# Patient Record
Sex: FEMALE | Race: WHITE | Employment: FULL TIME | URBAN - METROPOLITAN AREA
[De-identification: names, ages, dates, MRNs, and addresses within clinical notes are randomized per-mention and may not be internally consistent; named-entity substitution may affect disease eponyms.]

---

## 2022-11-25 ENCOUNTER — HOSPITAL ENCOUNTER (INPATIENT)
Age: 32
LOS: 2 days | Discharge: HOME | DRG: 872 | End: 2022-11-27
Attending: INTERNAL MEDICINE | Admitting: INTERNAL MEDICINE

## 2022-11-25 PROBLEM — N39.0 URINARY TRACT INFECTION: Status: ACTIVE | Noted: 2022-11-25

## 2022-11-25 PROBLEM — A41.9 SEPSIS (HCC): Status: ACTIVE | Noted: 2022-11-25

## 2022-11-25 PROBLEM — R10.30 ABDOMINAL PAIN, LOWER: Status: ACTIVE | Noted: 2022-11-25

## 2022-11-25 PROBLEM — E66.9 OBESITY: Status: ACTIVE | Noted: 2022-11-25

## 2022-11-25 PROBLEM — N83.209 OVARIAN CYSTIC MASS: Status: ACTIVE | Noted: 2022-11-25

## 2022-11-25 PROCEDURE — 1200000000 HC SEMI PRIVATE

## 2022-11-25 PROCEDURE — 6360000002 HC RX W HCPCS: Performed by: INTERNAL MEDICINE

## 2022-11-25 RX ORDER — ONDANSETRON 2 MG/ML
4 INJECTION INTRAMUSCULAR; INTRAVENOUS EVERY 6 HOURS PRN
Status: DISCONTINUED | OUTPATIENT
Start: 2022-11-25 | End: 2022-11-27 | Stop reason: HOSPADM

## 2022-11-25 RX ORDER — SODIUM CHLORIDE 0.9 % (FLUSH) 0.9 %
5-40 SYRINGE (ML) INJECTION PRN
Status: DISCONTINUED | OUTPATIENT
Start: 2022-11-25 | End: 2022-11-27 | Stop reason: HOSPADM

## 2022-11-25 RX ORDER — ACETAMINOPHEN 650 MG/1
650 SUPPOSITORY RECTAL EVERY 6 HOURS PRN
Status: DISCONTINUED | OUTPATIENT
Start: 2022-11-25 | End: 2022-11-27 | Stop reason: HOSPADM

## 2022-11-25 RX ORDER — ENOXAPARIN SODIUM 100 MG/ML
30 INJECTION SUBCUTANEOUS 2 TIMES DAILY
Status: DISCONTINUED | OUTPATIENT
Start: 2022-11-25 | End: 2022-11-27 | Stop reason: HOSPADM

## 2022-11-25 RX ORDER — SODIUM CHLORIDE 0.9 % (FLUSH) 0.9 %
5-40 SYRINGE (ML) INJECTION EVERY 12 HOURS SCHEDULED
Status: DISCONTINUED | OUTPATIENT
Start: 2022-11-25 | End: 2022-11-27 | Stop reason: HOSPADM

## 2022-11-25 RX ORDER — POLYETHYLENE GLYCOL 3350 17 G/17G
17 POWDER, FOR SOLUTION ORAL DAILY PRN
Status: DISCONTINUED | OUTPATIENT
Start: 2022-11-25 | End: 2022-11-27 | Stop reason: HOSPADM

## 2022-11-25 RX ORDER — SODIUM CHLORIDE 9 MG/ML
INJECTION, SOLUTION INTRAVENOUS CONTINUOUS
Status: DISCONTINUED | OUTPATIENT
Start: 2022-11-26 | End: 2022-11-26

## 2022-11-25 RX ORDER — ONDANSETRON 4 MG/1
4 TABLET, ORALLY DISINTEGRATING ORAL EVERY 8 HOURS PRN
Status: DISCONTINUED | OUTPATIENT
Start: 2022-11-25 | End: 2022-11-27 | Stop reason: HOSPADM

## 2022-11-25 RX ORDER — ACETAMINOPHEN 325 MG/1
650 TABLET ORAL EVERY 6 HOURS PRN
Status: DISCONTINUED | OUTPATIENT
Start: 2022-11-25 | End: 2022-11-27 | Stop reason: HOSPADM

## 2022-11-25 RX ORDER — SODIUM CHLORIDE 9 MG/ML
INJECTION, SOLUTION INTRAVENOUS PRN
Status: DISCONTINUED | OUTPATIENT
Start: 2022-11-25 | End: 2022-11-27 | Stop reason: HOSPADM

## 2022-11-25 RX ORDER — NORETHINDRONE ACETATE AND ETHINYL ESTRADIOL 1MG-20(21)
1 KIT ORAL DAILY
Status: ON HOLD | COMMUNITY
End: 2022-11-27 | Stop reason: HOSPADM

## 2022-11-25 RX ADMIN — ENOXAPARIN SODIUM 30 MG: 100 INJECTION SUBCUTANEOUS at 23:52

## 2022-11-25 ASSESSMENT — PAIN DESCRIPTION - FREQUENCY: FREQUENCY: INTERMITTENT

## 2022-11-25 ASSESSMENT — PAIN DESCRIPTION - ORIENTATION: ORIENTATION: LOWER

## 2022-11-25 ASSESSMENT — PAIN DESCRIPTION - PAIN TYPE: TYPE: ACUTE PAIN

## 2022-11-25 ASSESSMENT — PAIN DESCRIPTION - DESCRIPTORS: DESCRIPTORS: ACHING

## 2022-11-25 ASSESSMENT — PAIN DESCRIPTION - LOCATION: LOCATION: ABDOMEN

## 2022-11-25 ASSESSMENT — PAIN SCALES - GENERAL: PAINLEVEL_OUTOF10: 2

## 2022-11-25 NOTE — PROGRESS NOTES
Transfer/acceptance note  11/25/2022 from [facility] ER    Diagnosis:  Sepsis due to UTI  Pelvic mass  Ascites      70-year-old lady with no significant past medical history who presented to peripheral ER for 3-day history of lower abdominal pain worse on the right side with associated abdominal pressure and distention, loss of appetite, diaphoresis with no apparent chills or fevers. Urinalysis was positive. CT abdomen pelvis noted for pelvic mass with ascites  Laboratory work-up with WBC of 14.2, hemoglobin 13.4, hematocrit 40, platelets 0071  Sodium 133, potassium 4.0, chloride 100, bicarbonate 22, BUN 7, creatinine 1.0, glucose 145  Alkaline phosphatase 80, ALT 19, AST 26, lipase 24    On presentation to the ER she was noted to have a heart rate of 1 30-1 40 with blood pressure 135/79. She received IV fluids with improvement of heart rate to 115.   Saturations are 97% room air     She was concerning for sepsis due to UTI complicated by pelvic mass with ascites

## 2022-11-26 ENCOUNTER — APPOINTMENT (OUTPATIENT)
Dept: ULTRASOUND IMAGING | Age: 32
DRG: 872 | End: 2022-11-26
Attending: INTERNAL MEDICINE

## 2022-11-26 LAB
A/G RATIO: 0.8 (ref 1.1–2.2)
ALBUMIN SERPL-MCNC: 3.2 G/DL (ref 3.4–5)
ALP BLD-CCNC: 69 U/L (ref 40–129)
ALT SERPL-CCNC: 9 U/L (ref 10–40)
ANION GAP SERPL CALCULATED.3IONS-SCNC: 10 MMOL/L (ref 3–16)
AST SERPL-CCNC: 11 U/L (ref 15–37)
BASOPHILS ABSOLUTE: 0 K/UL (ref 0–0.2)
BASOPHILS RELATIVE PERCENT: 0.3 %
BILIRUB SERPL-MCNC: 1.3 MG/DL (ref 0–1)
BUN BLDV-MCNC: 8 MG/DL (ref 7–20)
CA 125: 70.4 U/ML (ref 0–35)
CALCIUM SERPL-MCNC: 9.1 MG/DL (ref 8.3–10.6)
CHLORIDE BLD-SCNC: 101 MMOL/L (ref 99–110)
CO2: 23 MMOL/L (ref 21–32)
CREAT SERPL-MCNC: 0.7 MG/DL (ref 0.6–1.1)
EOSINOPHILS ABSOLUTE: 0 K/UL (ref 0–0.6)
EOSINOPHILS RELATIVE PERCENT: 0.1 %
GFR SERPL CREATININE-BSD FRML MDRD: >60 ML/MIN/{1.73_M2}
GLUCOSE BLD-MCNC: 129 MG/DL (ref 70–99)
HCT VFR BLD CALC: 34.6 % (ref 36–48)
HEMOGLOBIN: 11.3 G/DL (ref 12–16)
LACTIC ACID: 0.9 MMOL/L (ref 0.4–2)
LYMPHOCYTES ABSOLUTE: 0.8 K/UL (ref 1–5.1)
LYMPHOCYTES RELATIVE PERCENT: 6.1 %
MCH RBC QN AUTO: 25.8 PG (ref 26–34)
MCHC RBC AUTO-ENTMCNC: 32.6 G/DL (ref 31–36)
MCV RBC AUTO: 79.2 FL (ref 80–100)
MONOCYTES ABSOLUTE: 1.5 K/UL (ref 0–1.3)
MONOCYTES RELATIVE PERCENT: 11.2 %
NEUTROPHILS ABSOLUTE: 10.7 K/UL (ref 1.7–7.7)
NEUTROPHILS RELATIVE PERCENT: 82.3 %
PDW BLD-RTO: 15.3 % (ref 12.4–15.4)
PLATELET # BLD: 446 K/UL (ref 135–450)
PMV BLD AUTO: 7.3 FL (ref 5–10.5)
POTASSIUM REFLEX MAGNESIUM: 4.6 MMOL/L (ref 3.5–5.1)
RBC # BLD: 4.38 M/UL (ref 4–5.2)
SODIUM BLD-SCNC: 134 MMOL/L (ref 136–145)
TOTAL PROTEIN: 7.4 G/DL (ref 6.4–8.2)
WBC # BLD: 13 K/UL (ref 4–11)

## 2022-11-26 PROCEDURE — 36415 COLL VENOUS BLD VENIPUNCTURE: CPT

## 2022-11-26 PROCEDURE — 2580000003 HC RX 258: Performed by: INTERNAL MEDICINE

## 2022-11-26 PROCEDURE — 6370000000 HC RX 637 (ALT 250 FOR IP): Performed by: INTERNAL MEDICINE

## 2022-11-26 PROCEDURE — 85025 COMPLETE CBC W/AUTO DIFF WBC: CPT

## 2022-11-26 PROCEDURE — 6360000002 HC RX W HCPCS: Performed by: INTERNAL MEDICINE

## 2022-11-26 PROCEDURE — 76856 US EXAM PELVIC COMPLETE: CPT

## 2022-11-26 PROCEDURE — 1200000000 HC SEMI PRIVATE

## 2022-11-26 PROCEDURE — 83605 ASSAY OF LACTIC ACID: CPT

## 2022-11-26 PROCEDURE — 80053 COMPREHEN METABOLIC PANEL: CPT

## 2022-11-26 PROCEDURE — 86304 IMMUNOASSAY TUMOR CA 125: CPT

## 2022-11-26 PROCEDURE — 76700 US EXAM ABDOM COMPLETE: CPT

## 2022-11-26 RX ORDER — OXYCODONE HYDROCHLORIDE 5 MG/1
5 TABLET ORAL EVERY 4 HOURS PRN
Status: DISCONTINUED | OUTPATIENT
Start: 2022-11-26 | End: 2022-11-27 | Stop reason: HOSPADM

## 2022-11-26 RX ORDER — LORAZEPAM 0.5 MG/1
0.5 TABLET ORAL EVERY 4 HOURS PRN
Status: DISCONTINUED | OUTPATIENT
Start: 2022-11-26 | End: 2022-11-27 | Stop reason: HOSPADM

## 2022-11-26 RX ORDER — OXYCODONE HYDROCHLORIDE 5 MG/1
5 TABLET ORAL ONCE
Status: COMPLETED | OUTPATIENT
Start: 2022-11-26 | End: 2022-11-26

## 2022-11-26 RX ADMIN — ACETAMINOPHEN 650 MG: 325 TABLET ORAL at 21:21

## 2022-11-26 RX ADMIN — ACETAMINOPHEN 650 MG: 325 TABLET ORAL at 04:52

## 2022-11-26 RX ADMIN — OXYCODONE 5 MG: 5 TABLET ORAL at 16:51

## 2022-11-26 RX ADMIN — OXYCODONE 5 MG: 5 TABLET ORAL at 11:24

## 2022-11-26 RX ADMIN — OXYCODONE 5 MG: 5 TABLET ORAL at 04:52

## 2022-11-26 RX ADMIN — ENOXAPARIN SODIUM 30 MG: 100 INJECTION SUBCUTANEOUS at 21:23

## 2022-11-26 RX ADMIN — CEFTRIAXONE SODIUM 1000 MG: 1 INJECTION, POWDER, FOR SOLUTION INTRAMUSCULAR; INTRAVENOUS at 00:00

## 2022-11-26 RX ADMIN — Medication 10 ML: at 00:00

## 2022-11-26 RX ADMIN — ENOXAPARIN SODIUM 30 MG: 100 INJECTION SUBCUTANEOUS at 08:10

## 2022-11-26 RX ADMIN — SODIUM CHLORIDE: 9 INJECTION, SOLUTION INTRAVENOUS at 01:19

## 2022-11-26 ASSESSMENT — PAIN DESCRIPTION - LOCATION
LOCATION: ABDOMEN

## 2022-11-26 ASSESSMENT — PAIN SCALES - GENERAL
PAINLEVEL_OUTOF10: 5
PAINLEVEL_OUTOF10: 3
PAINLEVEL_OUTOF10: 4
PAINLEVEL_OUTOF10: 7
PAINLEVEL_OUTOF10: 2
PAINLEVEL_OUTOF10: 2
PAINLEVEL_OUTOF10: 7

## 2022-11-26 ASSESSMENT — PAIN DESCRIPTION - ORIENTATION
ORIENTATION: LOWER

## 2022-11-26 ASSESSMENT — PAIN DESCRIPTION - DESCRIPTORS
DESCRIPTORS: DISCOMFORT
DESCRIPTORS: ACHING

## 2022-11-26 ASSESSMENT — PAIN - FUNCTIONAL ASSESSMENT: PAIN_FUNCTIONAL_ASSESSMENT: ACTIVITIES ARE NOT PREVENTED

## 2022-11-26 NOTE — CONSULTS
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 She had been in her usual state of good health until three or four days ago when she had onset of dysuria. She treated for a UTI without change in her symptoms. She noted pressure and fullness and went to a local urgent care where imaging revealed the mass. 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 Since arrival she has continued overall to feel well other than pressure. She is passing gas and has low stool output in keeping with not eating well for the past 3-4 days. She has not had any problem meeting her work requirements, denying easy fatigue and early satiety. She has regular periods on oral contraceptives. Family history is non-contributory. 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 She will need a laparotomy and the details of the surgery have yet to be discussed. In any event as she is not in pain and is not obstructed. She is able to eat and drink normally. In short, this is not an emergency situation. The patient and her family will discuss the logistics. The parents live in [facility]. 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 She may elect to be discharged after the MRI and be readmitted at the hospital of her choice or she may opt to stay and have surgery here. We will re-discuss this tomorrow after the MRI results. Ann Juarez M.D., [address] [facility]  Consultative Gynecology  [phone]

## 2022-11-26 NOTE — PROGRESS NOTES
4 Eyes Skin Assessment     NAME:  Elliot Mo  YOB: 1990  MEDICAL RECORD NUMBER:  [id number]    The patient is being assessed for  Admission    I agree that One RN have performed a thorough Head to Toe Skin Assessment on the patient. ALL assessment sites listed below have been assessed. Areas assessed by both nurses:    Head, Face, Ears, Shoulders, Back, Chest, Arms, Elbows, Hands, Sacrum. Buttock, Coccyx, Ischium, and Legs. Feet and Heels        Does the Patient have a Wound?  No noted wound(s)       Lalo Prevention initiated by RN: No   Wound Care Orders initiated by RN: No    Pressure Injury (Stage 3,4, Unstageable, DTI, NWPT, and Complex wounds) if present place referral order by RN under : No    New and Established Ostomies, if present place, referral order under : No      Nurse 1 eSignature: Electronically signed by Carly Hayes RN on 11/25/22 at 10:40 PM EST    **SHARE this note so that the co-signing nurse is able to place an eSignature**    Nurse 2 eSignature: Electronically signed by Inessa Middleton RN on 11/25/22 at 10:55 PM EST

## 2022-11-26 NOTE — CONSULTS
Oncology Hematology Care   CONSULT NOTE    11/26/2022 2:18 PM    Patient Information: Wallace Ramirezena   Date of Admit:  11/25/2022  Primary Care Physician:  No primary care provider on file. Requesting Physician:  Sanjay Carr MD    Reason for consult:    Hematology/oncology was consulted for Pelvic mass    Chief complaint:    Hematology/oncology was consulted for Pelvic mass       History of Present Illness:    Estrada Lafleur is a 73-year-old female with no significant past medical history. 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 The patient states that she has been having ongoing abdominal pain and feeling unwell for 3 to 4 days prior to presenting to the outside ER. When she presented to the outside ER she was found to have a pelvic mass and ascites concerning for an ovarian malignancy. As well as complicated UTI. Since starting antibiotics and fluid resuscitation the patient states that she is feeling better. Past Medical History:     has no past medical history on file. Past Surgical History:    No past surgical history on file. Current Medications:     sodium chloride flush  5-40 mL IntraVENous 2 times per day    enoxaparin  30 mg SubCUTAneous BID    cefTRIAXone (ROCEPHIN) IV  1,000 mg IntraVENous Q24H           Allergies:    No Known Allergies     Social History:    reports that she has never smoked. She has never used smokeless tobacco.         Family History:     family history is not on file.          ROS:  14 point review of systems performed negative except for as documented in the HPI        PHYSICAL EXAM:        Vitals:    11/26/22 0800   BP: (!) 144/92   Pulse: (!) 105   Resp: 16   Temp: 98.2 °F (36.8 °C)   SpO2: 96%          CONSTITUTIONAL: awake, alert, cooperative, no apparent distress     EYES: No Conjunctivitis, No icterus    ENT: Normocephalic, atraumatic, No external ear deformities    NECK: No palpable adenopathy    HEMATOLOGIC/LYMPHATIC: no cervical, supraclavicular or axillary lymphadenopathy     LUNGS:  clear to auscultation no added sounds    CARDIOVASCULAR: regular rate and rhythm, normal S1 and S2, no murmur noted    ABDOMEN: Soft, Non tender, Non distended, No hepatosplenomegaly     MUSCULOSKELETAL: full range of motion noted, tone is normal    NEUROLOGIC: AAO X 3, No gross focal neurological deficits    EXTREMITIES: no LE edema             DATA:  CBC:   Lab Results   Component Value Date/Time    WBC 13.0 11/26/2022 06:10 AM    RBC 4.38 11/26/2022 06:10 AM    HGB 11.3 11/26/2022 06:10 AM    HCT 34.6 11/26/2022 06:10 AM    MCV 79.2 11/26/2022 06:10 AM    MCH 25.8 11/26/2022 06:10 AM    MCHC 32.6 11/26/2022 06:10 AM    RDW 15.3 11/26/2022 06:10 AM     11/26/2022 06:10 AM    MPV 7.3 11/26/2022 06:10 AM     BMP:  Lab Results   Component Value Date/Time     11/26/2022 06:10 AM    K 4.6 11/26/2022 06:10 AM     11/26/2022 06:10 AM    CO2 23 11/26/2022 06:10 AM    BUN 8 11/26/2022 06:10 AM    CREATININE 0.7 11/26/2022 06:10 AM    CALCIUM 9.1 11/26/2022 06:10 AM    LABGLOM >60 11/26/2022 06:10 AM    GLUCOSE 129 11/26/2022 06:10 AM     Magnesium: No results found for: MG  LIVER PROFILE:   Recent Labs     11/26/22  0610   AST 11*   ALT 9*   BILITOT 1.3*   ALKPHOS 69     PT/INR:  No results found for: PROTIME, INR        IMPRESSION/RECOMMENDATIONS:      71-year-old female who presents to outside ER for abdominal pain found to have a pelvic mass concerning for ovarian malignancy for which hematology/oncology was consulted    #Pelvic mass  -Outside CT scan report unavailable  -Pelvic ultrasound performed today results of which are pending  -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   -Given she lives close to [employer] she wishes to be transferred there for her care  -We will get MRI of the abdomen and pelvis with contrast for further characterization and visualization of the ovarian mass  -If ultrasound shows ascites will get paracentesis to be sent for cytology.       Katia Troncoso MD  Oncology Hematology Care

## 2022-11-26 NOTE — CARE COORDINATION
Discharge Planning:      CM/SW has reviewed this patient's medical history in detail and updated the computerized patient record. Patient independent prior to admission. There are no needs identified at this time. If something specific is identified, please notify Discharge Planner.       Financial consult placed, pt is self-pay    Electronically signed by Neri Camilo RN on 11/26/2022 at 12:36 PM Standard Counseling: I stressed the importance of regular monthly self-examinations. They should examine the buttocks, gluteal cleft, genitalia and bottom of the feet with a hand held mirror. Detail Level: Detailed Additional Instructions: Standard

## 2022-11-26 NOTE — PROGRESS NOTES
Hospitalist Progress Note      PCP: No primary care provider on file. Date of Admission: 11/25/2022    LOS: 1    Chief Complaint: No chief complaint on file. Case Summary:   71-year-old lady with no significant past medical history who presented to peripheral ER for 3-day history of lower abdominal pain worse on the right side with associated abdominal pressure and distention, loss of appetite, diaphoresis and CT abdomen noted for pelvic mass with ascites concerning for ovarian malignancy. She is complicated by urinary tract infection with sepsis      Active Hospital Problems    Diagnosis Date Noted    Urinary tract infection [N39.0] 11/25/2022     Priority: Medium    Sepsis ([address] [A41.9] 11/25/2022     Priority: Medium    Abdominal pain, lower [R10.30] 11/25/2022     Priority: Medium    Ovarian cystic mass [N83.209] 11/25/2022     Priority: Medium    Obesity [E66.9] 11/25/2022     Priority: Medium         Principal Problem:    Sepsis ([address]  Active Problems:    Urinary tract infection    Abdominal pain, lower    Ovarian cystic mass    Obesity  Resolved Problems:    * No resolved hospital problems. *    -Continue IV antibiotics  - Follow-up CA-125  - Discussed with oncology and patient. We will get MRI abdomen pelvis per oncology  - Patient would prefer being closer to home in [place]. We will start working on transfer to [facility]  - Awaits gynecology consult and abdominal/pelvic ultrasound results      Medications:  Reviewed  Infusion Medications    sodium chloride       Scheduled Medications    sodium chloride flush  5-40 mL IntraVENous 2 times per day    enoxaparin  30 mg SubCUTAneous BID    cefTRIAXone (ROCEPHIN) IV  1,000 mg IntraVENous Q24H     PRN Meds: oxyCODONE, sodium chloride flush, sodium chloride, ondansetron **OR** ondansetron, acetaminophen **OR** acetaminophen, polyethylene glycol      DVT Prophylaxis: Subcut enoxaparin  Diet: ADULT DIET;  Regular  Code Status: Full Code    Dispo: Anticipate transfer to [facility] once bed available accepted    ____________________________________________________________________________    Subjective:   Overnight Events:   Uneventful overnight  Still with some abdominal discomfort     Physical Exam:  BP (!) 144/92   Pulse (!) 105   Temp 98.2 °F (36.8 °C) (Oral)   Resp 16   Ht 5' 6\" (1.676 m)   Wt 245 lb 14.4 oz (111.5 kg)   SpO2 96%   BMI 39.69 kg/m²   General appearance: No apparent distress, appears stated age and cooperative. HEENT: Normocephalic, atraumatic, MMM, No sclera icterus/conjuctival palor  Neck: Supple, no thyromegally. No jugular venous distention. Respiratory:  Normal respiratory effort. Clear to auscultation, no Rales/Wheezes/Rhonchi. Cardiovascular: S1/S2 without murmurs, rubs or gallops. RRR  Abdomen: Soft, non-tender, non-distended, bowel sounds present. Musculoskeletal: No clubbing, cyanosis or edema bilaterally. Intake/Output Summary (Last 24 hours) at 11/26/2022 1353  Last data filed at 11/26/2022 0505  Gross per 24 hour   Intake 413.06 ml   Output --   Net 413.06 ml       Labs:   Recent Labs     11/26/22  0610   WBC 13.0*   HGB 11.3*   HCT 34.6*         Recent Labs     11/26/22  0610   *   K 4.6      CO2 23   BUN 8   CREATININE 0.7   CALCIUM 9.1   AST 11*   ALT 9*   BILITOT 1.3*   ALKPHOS 69     No results for input(s): Nando Waters in the last 72 hours. Urinalysis:  No results found for: Brian Fabwilfrido, BACTERIA, RBCUA, BLOODU, Hermon Dross, [address]    Radiology:  US ABDOMEN COMPLETE    (Results Pending)   US PELVIS COMPLETE    (Results Pending)   MRI ABDOMEN W CONTRAST    (Results Pending)   MRI PELVIS W CONTRAST    (Results Pending)           Mary Robison MD      Please excuse brevity and/or typos. This report was transcribed using voice recognition software.  Every effort was made to ensure accuracy, however, inadvertent computerized transcription errors may be present.

## 2022-11-26 NOTE — H&P
Hospital Medicine History & Physical      PCP: No primary care provider on file. Date of Admission: 11/25/2022    Date of Service: Pt seen/examined on 11/25/2022  and Admitted to Inpatient with expected LOS greater than two midnights due to medical therapy. Chief Complaint: Lower abdominal pain/pressure, loss of appetite,    History Of Present Illness:   78-year-old lady with no significant past medical history who was seen in the peripheral area having presented with 2 to 3-day history of bilateral lower abdominal pain worse on the left side with associated bloating, loss of appetite and reports of diaphoresis with no overt fevers or chills. No cough or production. No nausea or vomiting. In the ER, CT abdomen pelvis was noted for septated pelvic mass of unclear etiology with no overt lateralization mention. Laboratory work-up noted for positive urinalysis with WBC of 14.2, hemoglobin 13.4, hematocrit 40, platelets 345, sodium 133, potassium 4.0, chloride 100, bicarbonate of 22, BUN 7, creatinine 1.0 with normal LFTs and lipase. On presentation to the ER, she was noted to have tachycardia of 130s to 140s with stable blood pressure. Tachycardia improved with IV hydration. Past Medical History:    History reviewed. No pertinent past medical history. Past Surgical History:    No past surgical history on file. Medications Prior to Admission:    Prior to Admission medications    Medication Sig Start Date End Date Taking? Authorizing Provider   norethindrone-ethinyl estradiol (JUNEL FE 1/20) 1-20 MG-MCG per tablet Take 1 tablet by mouth daily   Yes Historical Provider, MD       Allergies:  Patient has no known allergies. Social History:  The patient currently lives with family    TOBACCO:   reports that she has never smoked. She has never used smokeless tobacco.  ETOH:   has no history on file for alcohol use.       Family History:  Reviewed in detail and negative for DM, Early CAD, Cancer, CVA. Positive as follows:    No family history on file. REVIEW OF SYSTEMS:   Positive for generalized lower abdominal pain, decreased appetite, obesity and as noted in the HPI. All other systems reviewed and negative. PHYSICAL EXAM:    BP (!) 149/83   Pulse (!) 102   Temp 98.9 °F (37.2 °C) (Oral)   Resp 18   Ht 5' 6\" (1.676 m)   Wt 245 lb 1.6 oz (111.2 kg)   BMI 39.56 kg/m²     General appearance: No apparent distress appears stated age and cooperative. HEENT Normal cephalic, atraumatic without obvious deformity. Pupils equal, round, and reactive to light. Extra ocular muscles intact. Conjunctivae/corneas clear. Neck: Supple, No jugular venous distention/bruits. Lungs: Clear to auscultation, bilaterally without Rales/Wheezes/Rhonchi with good respiratory effort. Heart: Regular rate and rhythm with Normal S1/S2 without murmurs, rubs or gallops  Abdomen: Soft, non-tender or non-distended without rigidity or guarding and positive bowel sounds  Extremities: No clubbing, cyanosis, or edema bilaterally. Skin: Skin color, texture, turgor normal.  No rashes or lesions. Neurologic: Alert and oriented X 3, neurovascularly intact with sensory/motor intact upper extremities/lower extremities, bilaterally. Cranial nerves: II-XII intact, grossly non-focal.  Mental status: Alert, oriented, thought content appropriate.         Active Hospital Problems    Diagnosis Date Noted    Urinary tract infection [N39.0] 11/25/2022     Priority: Medium    Sepsis ([address] [A41.9] 11/25/2022     Priority: Medium    Abdominal pain, lower [R10.30] 11/25/2022     Priority: Medium    Ovarian cystic mass [N83.209] 11/25/2022     Priority: Medium    Obesity [E66.9] 11/25/2022     Priority: Medium         ASSESSMENT/PLAN:  67-year-old lady with no significant past medical history who presented with lower abdominal pain and associated pressure with distention, loss of appetite, diaphoresis noted to have leukocytosis with positive UA and abnormal CT abdomen pelvis concerning for pelvic mass. Patient admitted for sepsis due to UTI and pelvic mass with ascites to rule out malignancy    Plan:  - Continue empiric antibiotic coverage for UTI with ceftriaxone  - Dynamics, CBCs and BMP  - Check Ca1 2 5  - Oncology consult  - Get pelvic ultrasound and ultrasound abdomen pelvis  - Continue other chronic home medication  - IV hydration          DVT Prophylaxis: Subcut enoxaparin  Diet: General diet with n.p.o. of ultrasound  Code Status: Full code         Rema Wolfe MD    Thank you No primary care provider on file. for the opportunity to be involved in this patient's care. If you have any questions or concerns please feel free to contact me at [phone].

## 2022-11-26 NOTE — PROGRESS NOTES
Pt to 5T @ 21:00, direct admit from [facility]. Pt VSS. Pt assessed and oriented to room. MD aware of pt arrival. Awaiting orders. Admission assessment and head to toe complete. Pt resting in bed with parents at bedside for support. She is very engaged in care and cooperative with an extensive healthcare background.

## 2022-11-27 ENCOUNTER — APPOINTMENT (OUTPATIENT)
Dept: MRI IMAGING | Age: 32
DRG: 872 | End: 2022-11-27
Attending: INTERNAL MEDICINE

## 2022-11-27 VITALS
RESPIRATION RATE: 14 BRPM | SYSTOLIC BLOOD PRESSURE: 134 MMHG | OXYGEN SATURATION: 95 % | DIASTOLIC BLOOD PRESSURE: 98 MMHG | HEART RATE: 100 BPM | BODY MASS INDEX: 39.52 KG/M2 | HEIGHT: 66 IN | TEMPERATURE: 98 F | WEIGHT: 245.9 LBS

## 2022-11-27 PROCEDURE — 6370000000 HC RX 637 (ALT 250 FOR IP): Performed by: INTERNAL MEDICINE

## 2022-11-27 PROCEDURE — 72197 MRI PELVIS W/O & W/DYE: CPT

## 2022-11-27 PROCEDURE — 2580000003 HC RX 258: Performed by: INTERNAL MEDICINE

## 2022-11-27 PROCEDURE — A9577 INJ MULTIHANCE: HCPCS | Performed by: STUDENT IN AN ORGANIZED HEALTH CARE EDUCATION/TRAINING PROGRAM

## 2022-11-27 PROCEDURE — 6360000002 HC RX W HCPCS: Performed by: INTERNAL MEDICINE

## 2022-11-27 PROCEDURE — 6370000000 HC RX 637 (ALT 250 FOR IP): Performed by: OBSTETRICS & GYNECOLOGY

## 2022-11-27 PROCEDURE — 6360000004 HC RX CONTRAST MEDICATION: Performed by: STUDENT IN AN ORGANIZED HEALTH CARE EDUCATION/TRAINING PROGRAM

## 2022-11-27 PROCEDURE — 2580000003 HC RX 258: Performed by: STUDENT IN AN ORGANIZED HEALTH CARE EDUCATION/TRAINING PROGRAM

## 2022-11-27 PROCEDURE — 74183 MRI ABD W/O CNTR FLWD CNTR: CPT

## 2022-11-27 RX ORDER — SODIUM CHLORIDE 9 MG/ML
INJECTION, SOLUTION INTRAVENOUS CONTINUOUS
Status: DISCONTINUED | OUTPATIENT
Start: 2022-11-27 | End: 2022-11-27 | Stop reason: HOSPADM

## 2022-11-27 RX ADMIN — LORAZEPAM 0.5 MG: 0.5 TABLET ORAL at 10:42

## 2022-11-27 RX ADMIN — Medication 10 ML: at 01:37

## 2022-11-27 RX ADMIN — OXYCODONE 5 MG: 5 TABLET ORAL at 05:29

## 2022-11-27 RX ADMIN — SODIUM CHLORIDE: 9 INJECTION, SOLUTION INTRAVENOUS at 12:36

## 2022-11-27 RX ADMIN — OXYCODONE 5 MG: 5 TABLET ORAL at 01:35

## 2022-11-27 RX ADMIN — Medication 10 ML: at 08:47

## 2022-11-27 RX ADMIN — GADOBENATE DIMEGLUMINE 20 ML: 529 INJECTION, SOLUTION INTRAVENOUS at 12:35

## 2022-11-27 RX ADMIN — CEFTRIAXONE SODIUM 1000 MG: 1 INJECTION, POWDER, FOR SOLUTION INTRAMUSCULAR; INTRAVENOUS at 05:16

## 2022-11-27 RX ADMIN — ONDANSETRON 4 MG: 2 INJECTION INTRAMUSCULAR; INTRAVENOUS at 10:42

## 2022-11-27 ASSESSMENT — PAIN SCALES - GENERAL
PAINLEVEL_OUTOF10: 6
PAINLEVEL_OUTOF10: 4
PAINLEVEL_OUTOF10: 6
PAINLEVEL_OUTOF10: 3

## 2022-11-27 ASSESSMENT — PAIN DESCRIPTION - DESCRIPTORS
DESCRIPTORS: ACHING;DISCOMFORT
DESCRIPTORS: ACHING;DISCOMFORT

## 2022-11-27 ASSESSMENT — PAIN DESCRIPTION - LOCATION
LOCATION: ABDOMEN
LOCATION: ABDOMEN

## 2022-11-27 ASSESSMENT — PAIN - FUNCTIONAL ASSESSMENT: PAIN_FUNCTIONAL_ASSESSMENT: ACTIVITIES ARE NOT PREVENTED

## 2022-11-27 NOTE — SIGNIFICANT EVENT
Patient had wished for transfer to [facility]. Transfer call was initiated and I discussed case with Dr. Shanon Claudio. Based on the current work-up including imaging and laboratory work-up with low Ca125, they are less impressed for malignancy at this time.   They will make arrangements to follow up with patient in the outpatient in the coming week once discharged from the hospital.

## 2022-11-27 NOTE — PROGRESS NOTES
Patient without new complaints  MRI concerning appearance with nodularity noted as well as ascites.  Patient wants surgery in [place] and at [facility]   Will discharge and readmit today

## 2022-11-27 NOTE — PROGRESS NOTES
Spoke to Elio Marte NP, Pt was ordered a stat MRI pelvis/abd this am 11/26. It was not done yet, Nithin Phipps stated to call on call MRI to see if they needed to come in tonight or first thing In am. Call to oncall mri, spoke with Lisa Manuel, stated they received the MRI checklist but did not know what they were looking for, called day nurse back for more info. DId not get to MRI 11/26, Lisa Manuel states they will not get to this until first thing Monday morning. Informed Nithin Phipps.

## 2022-11-27 NOTE — PROGRESS NOTES
Hospitalist Progress Note      PCP: No primary care provider on file. Date of Admission: 11/25/2022    LOS: 2    Chief Complaint: No chief complaint on file. Case Summary:   71-year-old lady with no significant past medical history who presented to peripheral ER for 3-day history of lower abdominal pain worse on the right side with associated abdominal pressure and distention, loss of appetite, diaphoresis and CT abdomen noted for pelvic mass with ascites concerning for ovarian malignancy. She is complicated by urinary tract infection with sepsis      Active Hospital Problems    Diagnosis Date Noted    Urinary tract infection [N39.0] 11/25/2022     Priority: Medium    Sepsis ([address] [A41.9] 11/25/2022     Priority: Medium    Abdominal pain, lower [R10.30] 11/25/2022     Priority: Medium    Ovarian cystic mass [N83.209] 11/25/2022     Priority: Medium    Obesity [E66.9] 11/25/2022     Priority: Medium         Principal Problem:    Sepsis ([address]  Active Problems:    Urinary tract infection    Abdominal pain, lower    Ovarian cystic mass    Obesity  Resolved Problems:    * No resolved hospital problems. *    -Continue IV antibiotics  - Follow-up CA-125  - Discussed with oncology and patient. We will get MRI abdomen pelvis per oncology  - Awaits MRI result and further res     Medications:  Reviewed  Infusion Medications    sodium chloride       Scheduled Medications    sodium chloride flush  5-40 mL IntraVENous 2 times per day    enoxaparin  30 mg SubCUTAneous BID    cefTRIAXone (ROCEPHIN) IV  1,000 mg IntraVENous Q24H     PRN Meds: oxyCODONE, LORazepam, sodium chloride flush, sodium chloride, ondansetron **OR** ondansetron, acetaminophen **OR** acetaminophen, polyethylene glycol      DVT Prophylaxis: Subcut enoxaparin  Diet: ADULT DIET; Regular  Code Status: Full Code    Dispo:  Anticipate transfer to [facility] once bed available accepted    ____________________________________________________________________________    Subjective:   Overnight Events:   Uneventful overnight  Still with some abdominal discomfort     Physical Exam:  BP (!) 134/98   Pulse 100   Temp 98 °F (36.7 °C) (Oral)   Resp 14   Ht 5' 6\" (1.676 m)   Wt 245 lb 14.4 oz (111.5 kg)   SpO2 95%   BMI 39.69 kg/m²   General appearance: No apparent distress, appears stated age and cooperative. HEENT: Normocephalic, atraumatic, MMM, No sclera icterus/conjuctival palor  Neck: Supple, no thyromegally. No jugular venous distention. Respiratory:  Normal respiratory effort. Clear to auscultation, no Rales/Wheezes/Rhonchi. Cardiovascular: S1/S2 without murmurs, rubs or gallops. RRR  Abdomen: Soft, non-tender, non-distended, bowel sounds present. Musculoskeletal: No clubbing, cyanosis or edema bilaterally. No intake or output data in the 24 hours ending 11/27/22 0944      Labs:   Recent Labs     11/26/22  0610   WBC 13.0*   HGB 11.3*   HCT 34.6*         Recent Labs     11/26/22  0610   *   K 4.6      CO2 23   BUN 8   CREATININE 0.7   CALCIUM 9.1   AST 11*   ALT 9*   BILITOT 1.3*   ALKPHOS 69     No results for input(s): Valiant Medal in the last 72 hours. Urinalysis:  No results found for: Syeda Pinch, BACTERIA, RBCUA, BLOODU, [address], [address]    Radiology:  US ABDOMEN COMPLETE   Final Result   1. Extending from the right mid abdomen to the right lower quadrant, there is   a complicated cystic mass without significant vascularity measuring 17.0 x   16.6 x 16.1 cm. Recommend contrast-enhanced MRI for further evaluation. 2. Gallbladder contains stones and sludge but there are no other findings to   suggest acute cholecystitis. US PELVIS COMPLETE   Final Result   1. Extending from the right mid abdomen to the right lower quadrant, there is   a complicated cystic mass without significant vascularity measuring 17.0 x   16.6 x 16.1 cm. Recommend contrast-enhanced MRI for further evaluation. 2. Gallbladder contains stones and sludge but there are no other findings to   suggest acute cholecystitis. MRI ABDOMEN W CONTRAST    (Results Pending)   MRI PELVIS W CONTRAST    (Results Pending)           Nguyễn Youngblood MD      Please excuse brevity and/or typos. This report was transcribed using voice recognition software. Every effort was made to ensure accuracy, however, inadvertent computerized transcription errors may be present.

## 2022-12-27 NOTE — DISCHARGE SUMMARY
Admitting diagnosis: Large pelvic mass    Discharge diagnosis: large pelvic mass      Procedure: Examination and observation      Dr Jordyn Car  is a 33 y.o. G0 admitted with a large pelvic mass and a UTI. She had been in her usual state of good health until three or four days ago when she had onset of dysuria. She treated for a UTI without change in her symptoms. She noted pressure and fullness and went to a local urgent care where imaging revealed the mass. 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 Patient without new complaints  MRI concerning appearance with nodularity noted as well as ascites. Patient wants surgery in [place] and at [facility]   Will discharge and readmit today Patient was discharged to home after an uneventful hospital course and meeting discharge criteria, afebrile, and on normal diet. Normal pain medications plus Percocet were prescribed for PRN pain management. Audrey Landin MD  Gynecologic Oncology  [facility]